# Patient Record
Sex: MALE | Race: BLACK OR AFRICAN AMERICAN | NOT HISPANIC OR LATINO | Employment: FULL TIME | URBAN - NONMETROPOLITAN AREA
[De-identification: names, ages, dates, MRNs, and addresses within clinical notes are randomized per-mention and may not be internally consistent; named-entity substitution may affect disease eponyms.]

---

## 2022-01-19 ENCOUNTER — OFFICE VISIT (OUTPATIENT)
Dept: FAMILY MEDICINE | Facility: CLINIC | Age: 42
End: 2022-01-19
Payer: COMMERCIAL

## 2022-01-19 VITALS
OXYGEN SATURATION: 97 % | HEART RATE: 97 BPM | TEMPERATURE: 98 F | HEIGHT: 66 IN | DIASTOLIC BLOOD PRESSURE: 86 MMHG | RESPIRATION RATE: 20 BRPM | SYSTOLIC BLOOD PRESSURE: 130 MMHG

## 2022-01-19 DIAGNOSIS — K44.9 HIATAL HERNIA: ICD-10-CM

## 2022-01-19 DIAGNOSIS — K56.7 ILEUS: ICD-10-CM

## 2022-01-19 DIAGNOSIS — I10 PRIMARY HYPERTENSION: Primary | ICD-10-CM

## 2022-01-19 LAB
ALBUMIN SERPL BCP-MCNC: 2.8 G/DL (ref 3.5–5)
ALBUMIN/GLOB SERPL: 0.5 {RATIO}
ALP SERPL-CCNC: 72 U/L (ref 45–115)
ALT SERPL W P-5'-P-CCNC: 55 U/L (ref 16–61)
ANION GAP SERPL CALCULATED.3IONS-SCNC: 8 MMOL/L (ref 7–16)
AST SERPL W P-5'-P-CCNC: 34 U/L (ref 15–37)
BASOPHILS # BLD AUTO: 0.06 K/UL (ref 0–0.2)
BASOPHILS NFR BLD AUTO: 0.6 % (ref 0–1)
BILIRUB SERPL-MCNC: 0.5 MG/DL (ref 0–1.2)
BUN SERPL-MCNC: 14 MG/DL (ref 7–18)
BUN/CREAT SERPL: 8 (ref 6–20)
CALCIUM SERPL-MCNC: 9.2 MG/DL (ref 8.5–10.1)
CHLORIDE SERPL-SCNC: 113 MMOL/L (ref 98–107)
CHOLEST SERPL-MCNC: 130 MG/DL (ref 0–200)
CHOLEST/HDLC SERPL: 4.2 {RATIO}
CO2 SERPL-SCNC: 23 MMOL/L (ref 21–32)
CREAT SERPL-MCNC: 1.69 MG/DL (ref 0.7–1.3)
DIFFERENTIAL METHOD BLD: ABNORMAL
EOSINOPHIL # BLD AUTO: 0.25 K/UL (ref 0–0.5)
EOSINOPHIL NFR BLD AUTO: 2.7 % (ref 1–4)
ERYTHROCYTE [DISTWIDTH] IN BLOOD BY AUTOMATED COUNT: 15.6 % (ref 11.5–14.5)
GLOBULIN SER-MCNC: 5.1 G/DL (ref 2–4)
GLUCOSE SERPL-MCNC: 96 MG/DL (ref 74–106)
HCT VFR BLD AUTO: 45.2 % (ref 40–54)
HDLC SERPL-MCNC: 31 MG/DL (ref 40–60)
HGB BLD-MCNC: 14.2 G/DL (ref 13.5–18)
IMM GRANULOCYTES # BLD AUTO: 0.07 K/UL (ref 0–0.04)
IMM GRANULOCYTES NFR BLD: 0.8 % (ref 0–0.4)
LDLC SERPL CALC-MCNC: 79 MG/DL
LDLC/HDLC SERPL: 2.5 {RATIO}
LYMPHOCYTES # BLD AUTO: 1.99 K/UL (ref 1–4.8)
LYMPHOCYTES NFR BLD AUTO: 21.5 % (ref 27–41)
MCH RBC QN AUTO: 28.5 PG (ref 27–31)
MCHC RBC AUTO-ENTMCNC: 31.4 G/DL (ref 32–36)
MCV RBC AUTO: 90.8 FL (ref 80–96)
MONOCYTES # BLD AUTO: 1.07 K/UL (ref 0–0.8)
MONOCYTES NFR BLD AUTO: 11.6 % (ref 2–6)
MPC BLD CALC-MCNC: 9.1 FL (ref 9.4–12.4)
NEUTROPHILS # BLD AUTO: 5.81 K/UL (ref 1.8–7.7)
NEUTROPHILS NFR BLD AUTO: 62.8 % (ref 53–65)
NONHDLC SERPL-MCNC: 99 MG/DL
NRBC # BLD AUTO: 0 X10E3/UL
NRBC, AUTO (.00): 0 %
PLATELET # BLD AUTO: 611 K/UL (ref 150–400)
POTASSIUM SERPL-SCNC: 4.2 MMOL/L (ref 3.5–5.1)
PROT SERPL-MCNC: 7.9 G/DL (ref 6.4–8.2)
RBC # BLD AUTO: 4.98 M/UL (ref 4.6–6.2)
SODIUM SERPL-SCNC: 140 MMOL/L (ref 136–145)
TRIGL SERPL-MCNC: 99 MG/DL (ref 35–150)
TSH SERPL DL<=0.005 MIU/L-ACNC: 0.17 UIU/ML (ref 0.36–3.74)
VLDLC SERPL-MCNC: 20 MG/DL
WBC # BLD AUTO: 9.25 K/UL (ref 4.5–11)

## 2022-01-19 PROCEDURE — 4010F ACE/ARB THERAPY RXD/TAKEN: CPT | Mod: ,,, | Performed by: FAMILY MEDICINE

## 2022-01-19 PROCEDURE — 3075F PR MOST RECENT SYSTOLIC BLOOD PRESS GE 130-139MM HG: ICD-10-PCS | Mod: ,,, | Performed by: FAMILY MEDICINE

## 2022-01-19 PROCEDURE — 1160F RVW MEDS BY RX/DR IN RCRD: CPT | Mod: ,,, | Performed by: FAMILY MEDICINE

## 2022-01-19 PROCEDURE — 3079F DIAST BP 80-89 MM HG: CPT | Mod: ,,, | Performed by: FAMILY MEDICINE

## 2022-01-19 PROCEDURE — 3075F SYST BP GE 130 - 139MM HG: CPT | Mod: ,,, | Performed by: FAMILY MEDICINE

## 2022-01-19 PROCEDURE — 1160F PR REVIEW ALL MEDS BY PRESCRIBER/CLIN PHARMACIST DOCUMENTED: ICD-10-PCS | Mod: ,,, | Performed by: FAMILY MEDICINE

## 2022-01-19 PROCEDURE — 99202 PR OFFICE/OUTPT VISIT, NEW, LEVL II, 15-29 MIN: ICD-10-PCS | Mod: ,,, | Performed by: FAMILY MEDICINE

## 2022-01-19 PROCEDURE — 80050 PR  GENERAL HEALTH PANEL: ICD-10-PCS | Mod: ICN,,, | Performed by: CLINICAL MEDICAL LABORATORY

## 2022-01-19 PROCEDURE — 99202 OFFICE O/P NEW SF 15 MIN: CPT | Mod: ,,, | Performed by: FAMILY MEDICINE

## 2022-01-19 PROCEDURE — 1159F PR MEDICATION LIST DOCUMENTED IN MEDICAL RECORD: ICD-10-PCS | Mod: ,,, | Performed by: FAMILY MEDICINE

## 2022-01-19 PROCEDURE — 1159F MED LIST DOCD IN RCRD: CPT | Mod: ,,, | Performed by: FAMILY MEDICINE

## 2022-01-19 PROCEDURE — 80050 GENERAL HEALTH PANEL: CPT | Mod: ICN,,, | Performed by: CLINICAL MEDICAL LABORATORY

## 2022-01-19 PROCEDURE — 4010F PR ACE/ARB THEARPY RXD/TAKEN: ICD-10-PCS | Mod: ,,, | Performed by: FAMILY MEDICINE

## 2022-01-19 PROCEDURE — 80061 LIPID PANEL: CPT | Mod: ICN,,, | Performed by: CLINICAL MEDICAL LABORATORY

## 2022-01-19 PROCEDURE — 3079F PR MOST RECENT DIASTOLIC BLOOD PRESSURE 80-89 MM HG: ICD-10-PCS | Mod: ,,, | Performed by: FAMILY MEDICINE

## 2022-01-19 PROCEDURE — 80061 LIPID PANEL: ICD-10-PCS | Mod: ICN,,, | Performed by: CLINICAL MEDICAL LABORATORY

## 2022-01-19 RX ORDER — POLYETHYLENE GLYCOL 3350 17 G/17G
17 POWDER, FOR SOLUTION ORAL DAILY
Qty: 90 EACH | Refills: 3 | Status: SHIPPED | OUTPATIENT
Start: 2022-01-19 | End: 2023-01-19

## 2022-01-19 RX ORDER — PHENTERMINE HYDROCHLORIDE 37.5 MG/1
37.5 TABLET ORAL EVERY MORNING
COMMUNITY
Start: 2021-12-03

## 2022-01-19 RX ORDER — SEMAGLUTIDE 1.34 MG/ML
1.5 INJECTION, SOLUTION SUBCUTANEOUS
COMMUNITY
Start: 2022-01-14

## 2022-01-19 RX ORDER — ERGOCALCIFEROL 1.25 MG/1
CAPSULE ORAL
COMMUNITY
Start: 2022-01-14

## 2022-01-19 RX ORDER — NIFEDIPINE 90 MG/1
90 TABLET, FILM COATED, EXTENDED RELEASE ORAL DAILY
COMMUNITY
Start: 2021-12-01

## 2022-01-19 RX ORDER — OMEPRAZOLE 40 MG/1
40 CAPSULE, DELAYED RELEASE ORAL DAILY
Qty: 90 CAPSULE | Refills: 1 | Status: SHIPPED | OUTPATIENT
Start: 2022-01-19 | End: 2023-01-19

## 2022-01-19 RX ORDER — LOSARTAN POTASSIUM 100 MG/1
100 TABLET ORAL DAILY
COMMUNITY
Start: 2022-01-14

## 2022-01-19 RX ORDER — SEMAGLUTIDE 1.34 MG/ML
1 INJECTION, SOLUTION SUBCUTANEOUS ONCE
COMMUNITY
Start: 2021-12-06

## 2022-01-19 NOTE — PROGRESS NOTES
Rosemarie Jerome MD        PATIENT NAME: Martin Hinojosa  : 1980  DATE: 22  MRN: [id number]      Billing Provider: Rosemarie Jerome MD  Level of Service:   Patient PCP Information     Provider PCP Type    Primary Doctor No General          Reason for Visit / Chief Complaint: Follow-up (Lost 20 pounds in 5 days; went to er and they found gallstones)       History of Present Illness      Martin Hinojosa presents to the clinic with Follow-up (Lost 20 pounds in 5 days; went to er and they found gallstones)     This pt went to the ED was told he had ileus and a large hiatal hernia, he does states that he was told CT showed gall bladder stones, however notes does not mention this    He states that after he was able to pass stool he felt better, the stool was very dark      Review of Systems     Review of Systems   Constitutional: Positive for unexpected weight change. Negative for activity change.   HENT: Negative for hearing loss, rhinorrhea and trouble swallowing.    Eyes: Negative for discharge and visual disturbance.   Respiratory: Negative for chest tightness and wheezing.    Cardiovascular: Negative for chest pain and palpitations.   Gastrointestinal: Positive for constipation. Negative for blood in stool, diarrhea and vomiting.   Endocrine: Negative for polydipsia and polyuria.   Genitourinary: Negative for difficulty urinating, hematuria and urgency.   Musculoskeletal: Negative for arthralgias, joint swelling and neck pain.   Neurological: Negative for weakness and headaches.   Psychiatric/Behavioral: Negative for confusion and dysphoric mood.       Medical / Social / Family History   History reviewed. No pertinent past medical history.    History reviewed. No pertinent surgical history.    Social History    reports that he has never smoked. He has never used smokeless tobacco. He reports that he does not drink alcohol and does not use drugs.    Family History  's family history is not on  "file.    Medications and Allergies     Medications  Outpatient Medications Marked as Taking for the 1/19/22 encounter (Office Visit) with Rosemarie Jerome MD   Medication Sig Dispense Refill    ergocalciferol (ERGOCALCIFEROL) 50,000 unit Cap       losartan (COZAAR) 100 MG tablet Take 100 mg by mouth once daily.      NIFEdipine (ADALAT CC) 90 MG TbSR Take 90 mg by mouth once daily.      OZEMPIC 1 mg/dose (2 mg/1.5 mL) PnIj Inject 1.5 pens into the skin.      OZEMPIC 1 mg/dose (4 mg/3 mL) Inject 1 mg into the skin once.      phentermine (ADIPEX-P) 37.5 mg tablet Take 37.5 mg by mouth every morning.         Allergies  Review of patient's allergies indicates:   Allergen Reactions    Ace inhibitors     Sulfa (sulfonamide antibiotics)        Physical Examination   /86 (BP Location: Left arm, Patient Position: Sitting, BP Method: Large (Automatic))   Pulse 97   Temp 98.2 °F (36.8 °C) (Temporal)   Resp 20   Ht 5' 6" (1.676 m)   SpO2 97%     Physical Exam  Constitutional:       Appearance: Normal appearance. He is obese.   Cardiovascular:      Rate and Rhythm: Normal rate and regular rhythm.   Pulmonary:      Effort: Pulmonary effort is normal.      Breath sounds: Normal breath sounds.   Abdominal:      General: There is no distension.      Palpations: There is no mass.      Tenderness: There is no abdominal tenderness. There is no guarding or rebound.      Hernia: No hernia is present.   Neurological:      Mental Status: He is alert.   Psychiatric:         Mood and Affect: Mood normal.         Behavior: Behavior normal.         Assessment and Plan (including Health Maintenance)     Plan:         Problem List Items Addressed This Visit    None     Visit Diagnoses     Primary hypertension    -  Primary    Relevant Orders    Lipid Panel    TSH    Hiatal hernia        Ileus        Relevant Medications    polyethylene glycol (GLYCOLAX) 17 gram PwPk    Other Relevant Orders    CT Abdomen Pelvis W Wo Contrast    " Comprehensive Metabolic Panel    TSH    CBC Auto Differential          My main concern here is that he might have an ileus from an obstruction, so He will need a repeat CT with oral contrast to rule out mass.    Labs send    Follow up in about 1 month (around 2/19/2022).        Signature:  Rosemarie Jerome MD      Date of encounter: 1/19/22

## 2022-01-25 ENCOUNTER — HOSPITAL ENCOUNTER (OUTPATIENT)
Dept: RADIOLOGY | Facility: HOSPITAL | Age: 42
Discharge: HOME | End: 2022-01-25
Attending: FAMILY MEDICINE
Payer: COMMERCIAL

## 2022-01-25 DIAGNOSIS — K56.7 ILEUS: ICD-10-CM

## 2022-01-25 PROCEDURE — 74176 CT ABD & PELVIS W/O CONTRAST: CPT | Mod: TC

## 2023-06-01 ENCOUNTER — LAB VISIT (OUTPATIENT)
Dept: PRIMARY CARE CLINIC | Facility: CLINIC | Age: 43
End: 2023-06-01

## 2023-06-01 DIAGNOSIS — Z02.83 ENCOUNTER FOR DRUG SCREENING: Primary | ICD-10-CM

## 2023-06-01 PROCEDURE — 99000 SPECIMEN HANDLING OFFICE-LAB: CPT | Mod: ,,, | Performed by: NURSE PRACTITIONER

## 2023-06-01 PROCEDURE — 99000 PR SPECIMEN HANDLING,DR OFF->LAB: ICD-10-PCS | Mod: ,,, | Performed by: NURSE PRACTITIONER

## 2023-06-01 NOTE — PROGRESS NOTES
Subjective     Patient ID: Martin Hinojosa is a 43 y.o. male.    Chief Complaint: No chief complaint on file.    HPI  Review of Systems       Objective     Physical Exam       Assessment and Plan     1. Encounter for drug screening        Drug testing only           No follow-ups on file.